# Patient Record
(demographics unavailable — no encounter records)

---

## 2024-12-12 NOTE — HISTORY OF PRESENT ILLNESS
[FreeTextEntry1] : follow up chronic medical conditions, [de-identified] : Ms. MARY SIMS is a 63 year female with a PMH of DM, HTN, HLD, GERD breast cancer 2015 right s/p lumpectomy comes to the office for follow up chronic medical conditions and c/o left side neck pain x several weeks. Patient denies any trauma Patient denies fever, cough SOB. No other complaints at this time.

## 2024-12-12 NOTE — HEALTH RISK ASSESSMENT
[0] : 2) Feeling down, depressed, or hopeless: Not at all (0) [PHQ-2 Negative - No further assessment needed] : PHQ-2 Negative - No further assessment needed [[id number]] : 0 [Never] : Never

## 2024-12-12 NOTE — PLAN
[FreeTextEntry1] : neck pain- patient prescribed Tizanidine PRN QHS and referred to pain management.   DM-  will increase Ozempic from 0.5 to 1.0mg qweekly will test HBA1C today and call patient with results   HTN- patient's BP well controlled with Carvedilol 25 mg PO BID, HCTZ 25 mg PO QD, and Losartan 100 mg PO QD  will continue current  regimen   History of breast cancer- patient will continue medication as prescribed and will follow with Heme/Onc Dr. Lock  Prior to appointment and during encounter with patient extensive medical records were reviewed including but not limited to, Hospital records, out patient records, laboratory data and microbiology data In addition extensive time was also spent in reviewing diagnostic studies.   Total encounter total time 30 mins >50% of time spent counseling/coordinating care  Counseling included abnormal lab results, differential diagnoses, treatment options, risks and benefits, lifestyle changes, current condition, medications, and dose adjustments.  The patient was interactive, attentive, asked questions, and verbalized understanding

## 2025-03-20 NOTE — PLAN
[FreeTextEntry1] : neck pain patient will follow with physical therapy.   DM-  Ozempic 1.0mg qweekly will test HBA1C today and call patient with results   HTN- patient's BP well controlled with Carvedilol 25 mg PO BID, HCTZ 25 mg PO QD, and Losartan 100 mg PO QD  will continue current  regimen   History of breast cancer- patient will continue medication as prescribed and will follow with Heme/Onc Dr. Lock  Prior to appointment and during encounter with patient extensive medical records were reviewed including but not limited to, Hospital records, out patient records, laboratory data and microbiology data In addition extensive time was also spent in reviewing diagnostic studies.   Total encounter total time 30 mins >50% of time spent counseling/coordinating care  Counseling included abnormal lab results, differential diagnoses, treatment options, risks and benefits, lifestyle changes, current condition, medications, and dose adjustments.  The patient was interactive, attentive, asked questions, and verbalized understanding

## 2025-03-20 NOTE — HISTORY OF PRESENT ILLNESS
[FreeTextEntry1] : follow up chronic medical conditions, [de-identified] : Ms. MARY SIMS is a 63 year female with a PMH of DM, HTN, HLD, GERD breast cancer 2015 right s/p lumpectomy comes to the office for follow up chronic medical conditions and c/o left side neck pain x several weeks. Patient denies any trauma Patient denies fever, cough SOB. No other complaints at this time.

## 2025-03-20 NOTE — HEALTH RISK ASSESSMENT
[0] : 2) Feeling down, depressed, or hopeless: Not at all (0) [PHQ-2 Negative - No further assessment needed] : PHQ-2 Negative - No further assessment needed [Never] : Never [[id number]] : 0

## 2025-03-20 NOTE — HISTORY OF PRESENT ILLNESS
[FreeTextEntry1] : follow up chronic medical conditions, [de-identified] : Ms. MARY SIMS is a 63 year female with a PMH of DM, HTN, HLD, GERD breast cancer 2015 right s/p lumpectomy comes to the office for follow up chronic medical conditions and c/o left side neck pain x several weeks. Patient denies any trauma Patient denies fever, cough SOB. No other complaints at this time.

## 2025-03-20 NOTE — HEALTH RISK ASSESSMENT
[0] : 2) Feeling down, depressed, or hopeless: Not at all (0) [PHQ-2 Negative - No further assessment needed] : PHQ-2 Negative - No further assessment needed [Never] : Never [[id number]] : 0

## 2025-07-11 NOTE — HEALTH RISK ASSESSMENT
[0] : 2) Feeling down, depressed, or hopeless: Not at all (0) [PHQ-2 Negative - No further assessment needed] : PHQ-2 Negative - No further assessment needed [Never] : Never [[id number]] : 0

## 2025-07-11 NOTE — HISTORY OF PRESENT ILLNESS
[FreeTextEntry1] : follow up chronic medical conditions, [de-identified] : Ms. MARY SIMS is a 63 year female with a PMH of DM, HTN, HLD, GERD breast cancer 2015 right s/p lumpectomy comes to the office for follow up chronic medical conditions and c/o left side neck pain x several weeks. Patient denies any trauma Patient denies fever, cough SOB. No other complaints at this time.

## 2025-07-11 NOTE — PLAN
[FreeTextEntry1] :  DM-  Ozempic increased to 2.0mg qweekly will test HBA1C today and call patient with results   HTN- patient's BP well controlled with Carvedilol 25 mg PO BID, HCTZ 25 mg PO QD, and Losartan 100 mg PO QD  will continue current  regimen   History of breast cancer- patient will continue medication as prescribed and will follow with Heme/Onc Dr. Lock  Prior to appointment and during encounter with patient extensive medical records were reviewed including but not limited to, Hospital records, out patient records, laboratory data and microbiology data In addition extensive time was also spent in reviewing diagnostic studies.   Total encounter total time 30 mins >50% of time spent counseling/coordinating care  Counseling included abnormal lab results, differential diagnoses, treatment options, risks and benefits, lifestyle changes, current condition, medications, and dose adjustments.  The patient was interactive, attentive, asked questions, and verbalized understanding